# Patient Record
Sex: MALE | Race: WHITE | URBAN - METROPOLITAN AREA
[De-identification: names, ages, dates, MRNs, and addresses within clinical notes are randomized per-mention and may not be internally consistent; named-entity substitution may affect disease eponyms.]

---

## 2023-08-10 ENCOUNTER — WEB ENCOUNTER (OUTPATIENT)
Dept: URBAN - METROPOLITAN AREA CLINIC 40 | Facility: CLINIC | Age: 58
End: 2023-08-10

## 2023-08-10 ENCOUNTER — OFFICE VISIT (OUTPATIENT)
Dept: URBAN - METROPOLITAN AREA CLINIC 40 | Facility: CLINIC | Age: 58
End: 2023-08-10
Payer: COMMERCIAL

## 2023-08-10 ENCOUNTER — LAB OUTSIDE AN ENCOUNTER (OUTPATIENT)
Dept: URBAN - METROPOLITAN AREA CLINIC 40 | Facility: CLINIC | Age: 58
End: 2023-08-10

## 2023-08-10 VITALS
SYSTOLIC BLOOD PRESSURE: 134 MMHG | DIASTOLIC BLOOD PRESSURE: 78 MMHG | TEMPERATURE: 97.7 F | BODY MASS INDEX: 24.35 KG/M2 | HEART RATE: 64 BPM | HEIGHT: 60 IN | WEIGHT: 124 LBS

## 2023-08-10 DIAGNOSIS — R74.8 ELEVATED LIVER ENZYMES: ICD-10-CM

## 2023-08-10 DIAGNOSIS — Z79.899 ON STATIN THERAPY: ICD-10-CM

## 2023-08-10 DIAGNOSIS — Z78.9 MODERATE ALCOHOL CONSUMPTION: ICD-10-CM

## 2023-08-10 DIAGNOSIS — K76.0 FATTY LIVER: ICD-10-CM

## 2023-08-10 PROCEDURE — 99203 OFFICE O/P NEW LOW 30 MIN: CPT | Performed by: PHYSICIAN ASSISTANT

## 2023-08-10 NOTE — HPI-TODAY'S VISIT:
Mr. Platt is a 58-year-old White male presents to the office today for evaluation of mild transaminitis.  Over the course the last year, he has had AST and ALT trending up from the 40s, to more recently in the 80s.  He has maintained normal total bilirubin and alkaline phosphatase.  He denies any significant abdominal pain.  He has had no liver imaging.  Reports that he had viral hepatitis panel completed which was negative for hepatitis A, B, and C.  Denies a family history of liver disease or hepatitis.  Patient himself does admit to moderate alcohol use in the past, typically drinking 4 or more beers on each day of the weekends and maybe 1-2 on weekdays.  He has significantly decreased his consumption of alcohol recently due to elevated liver enzymes.  He has also been on statin therapy Lipitor for several years.  The patient is not obese.  He has had no upper GI complaints of nausea, vomiting, jaundice.  Denies any change in bowel habits, rectal bleeding or melena.  Denies any use of nonprescribed medications, supplements.  His wife accompanies him to his visit today.  They are both originally from [place].

## 2023-08-25 ENCOUNTER — OFFICE VISIT (OUTPATIENT)
Dept: URBAN - METROPOLITAN AREA CLINIC 39 | Facility: CLINIC | Age: 58
End: 2023-08-25
Payer: COMMERCIAL

## 2023-08-25 DIAGNOSIS — R93.2 ABNORMAL ULTRASOUND OF LIVER: ICD-10-CM

## 2023-08-25 PROCEDURE — 76705 ECHO EXAM OF ABDOMEN: CPT | Performed by: INTERNAL MEDICINE

## 2023-08-28 LAB
ACTIN (SMOOTH MUSCLE) ANTIBODY (IGG): <20
ALPHA-1-ANTITRYPSIN QN: 104
ANA SCREEN, IFA: NEGATIVE
CERULOPLASMIN: 19
HEREDITARY HEMOCHROMATOSIS DNA MUT: (no result)
IMMUNOGLOBULIN A: 308
INTERPRETATION: (no result)
MITOCHONDRIAL (M2) ANTIBODY: <=20
TISSUE TRANSGLUTAMINASE AB, IGA: <1

## 2023-09-21 ENCOUNTER — OFFICE VISIT (OUTPATIENT)
Dept: URBAN - METROPOLITAN AREA CLINIC 40 | Facility: CLINIC | Age: 58
End: 2023-09-21
Payer: COMMERCIAL

## 2023-09-21 VITALS
HEART RATE: 60 BPM | HEIGHT: 60 IN | DIASTOLIC BLOOD PRESSURE: 84 MMHG | WEIGHT: 123.2 LBS | SYSTOLIC BLOOD PRESSURE: 118 MMHG | TEMPERATURE: 97.9 F | BODY MASS INDEX: 24.19 KG/M2

## 2023-09-21 DIAGNOSIS — Z78.9 MODERATE ALCOHOL CONSUMPTION: ICD-10-CM

## 2023-09-21 DIAGNOSIS — Z14.8 HEMOCHROMATOSIS CARRIER: ICD-10-CM

## 2023-09-21 DIAGNOSIS — R74.8 ELEVATED LIVER ENZYMES: ICD-10-CM

## 2023-09-21 DIAGNOSIS — Z79.899 ON STATIN THERAPY: ICD-10-CM

## 2023-09-21 PROCEDURE — 99213 OFFICE O/P EST LOW 20 MIN: CPT | Performed by: PHYSICIAN ASSISTANT

## 2023-09-21 NOTE — HPI-TODAY'S VISIT:
Mr. Platt is a 58-year-old White male seen 8/10/23 for evaluation of mild transaminitis.  Over the course the last year, he has had AST and ALT trending up from the 40s, to more recently in the 80s.  He has maintained normal total bilirubin and alkaline phosphatase.  He denies any significant abdominal pain. Reports that he had viral hepatitis panel completed which was negative for hepatitis A, B, and C.  Denies a family history of liver disease or hepatitis.  Patient himself does admit to moderate alcohol use in the past, typically drinking 4 or more beers on each day of the weekends and maybe 1-2 on weekdays.  He has significantly decreased his consumption of alcohol recently due to elevated liver enzymes.  He has also been on statin therapy Lipitor for several years.  The patient is not obese.  He has had no upper GI complaints of nausea, vomiting, jaundice.  Denies any change in bowel habits, rectal bleeding or melena.  Denies any use of nonprescribed medications, supplements. Recent HFP improved, AST and ALT decreasing again towards normal. Decreased alcohol. RUQ US 8/25/23 was normal. CLD panel negative. 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

## 2023-12-27 ENCOUNTER — DASHBOARD ENCOUNTERS (OUTPATIENT)
Age: 58
End: 2023-12-27

## 2023-12-27 ENCOUNTER — OFFICE VISIT (OUTPATIENT)
Dept: URBAN - METROPOLITAN AREA CLINIC 40 | Facility: CLINIC | Age: 58
End: 2023-12-27

## 2023-12-27 ENCOUNTER — OFFICE VISIT (OUTPATIENT)
Dept: URBAN - METROPOLITAN AREA CLINIC 40 | Facility: CLINIC | Age: 58
End: 2023-12-27
Payer: COMMERCIAL

## 2023-12-27 VITALS
HEIGHT: 60 IN | WEIGHT: 126 LBS | BODY MASS INDEX: 24.74 KG/M2 | DIASTOLIC BLOOD PRESSURE: 72 MMHG | SYSTOLIC BLOOD PRESSURE: 112 MMHG | HEART RATE: 74 BPM

## 2023-12-27 DIAGNOSIS — Z14.8 HEMOCHROMATOSIS CARRIER: ICD-10-CM

## 2023-12-27 DIAGNOSIS — Z78.9 MODERATE ALCOHOL CONSUMPTION: ICD-10-CM

## 2023-12-27 DIAGNOSIS — Z79.899 ON STATIN THERAPY: ICD-10-CM

## 2023-12-27 DIAGNOSIS — R74.8 ELEVATED LIVER ENZYMES: ICD-10-CM

## 2023-12-27 PROCEDURE — 99213 OFFICE O/P EST LOW 20 MIN: CPT | Performed by: PHYSICIAN ASSISTANT

## 2023-12-27 NOTE — HPI-TODAY'S VISIT:
Mr. Platt is a 58-year-old White male seen 9/21/23 for evaluation of mild transaminitis.  Over the course the last year, he has had AST and ALT trending up from the 40s, to more recently in the 80s.  He has maintained normal total bilirubin and alkaline phosphatase.  He denies any significant abdominal pain. Reports that he had viral hepatitis panel completed which was negative for hepatitis A, B, and C.  Denies a family history of liver disease or hepatitis.  Patient himself does admit to moderate alcohol use in the past, typically drinking 4 or more beers on each day of the weekends and maybe 1-2 on weekdays.  He has significantly decreased his consumption of alcohol recently due to elevated liver enzymes.  He has also been on statin therapy Lipitor for several years.  The patient is not obese.  He has had no upper GI complaints of nausea, vomiting, jaundice.  Denies any change in bowel habits, rectal bleeding or melena.  Denies any use of nonprescribed medications, supplements. Recent HFP improved, AST and ALT decreasing again towards normal. Decreased alcohol. RUQ US 8/25/23 was normal. CLD panel negative. 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 He tells me that he did have repeat labs on December 12, 2023 by his new primary care provider but does not have the results with him nor can he remember the name of his new primary care provider.  He will contact our office with that information but admits that he was told that his LFTs had improved.  He has been able to cut back on his drinking but admits his diet is still needing some improvement though his cholesterol has reportedly also decreased.

## 2024-06-17 ENCOUNTER — OFFICE VISIT (OUTPATIENT)
Dept: URBAN - METROPOLITAN AREA CLINIC 40 | Facility: CLINIC | Age: 59
End: 2024-06-17
Payer: COMMERCIAL

## 2024-06-17 ENCOUNTER — LAB OUTSIDE AN ENCOUNTER (OUTPATIENT)
Dept: URBAN - METROPOLITAN AREA CLINIC 40 | Facility: CLINIC | Age: 59
End: 2024-06-17

## 2024-06-17 VITALS
DIASTOLIC BLOOD PRESSURE: 78 MMHG | SYSTOLIC BLOOD PRESSURE: 120 MMHG | TEMPERATURE: 98.1 F | HEART RATE: 76 BPM | WEIGHT: 129 LBS | BODY MASS INDEX: 25.32 KG/M2 | HEIGHT: 60 IN

## 2024-06-17 DIAGNOSIS — Z14.8 HEMOCHROMATOSIS CARRIER: ICD-10-CM

## 2024-06-17 DIAGNOSIS — Z78.9 MODERATE ALCOHOL CONSUMPTION: ICD-10-CM

## 2024-06-17 DIAGNOSIS — R74.8 ELEVATED LIVER ENZYMES: ICD-10-CM

## 2024-06-17 DIAGNOSIS — Z79.899 ON STATIN THERAPY: ICD-10-CM

## 2024-06-17 PROCEDURE — 99214 OFFICE O/P EST MOD 30 MIN: CPT | Performed by: PHYSICIAN ASSISTANT

## 2024-06-17 RX ORDER — ATORVASTATIN CALCIUM 10 MG/1
1 TABLET TABLET, FILM COATED ORAL ONCE A DAY
Status: ACTIVE | COMMUNITY

## 2024-06-17 NOTE — HPI-TODAY'S VISIT:
Mr. Platt is a 58-year-old White male seen 12/27/23 for evaluation of mild transaminitis.  Over the course the last year, he has had AST and ALT trending up from the 40s, to more recently in the 80s.  He has maintained normal total bilirubin and alkaline phosphatase.  He denies any significant abdominal pain. Reports that he had viral hepatitis panel completed which was negative for hepatitis A, B, and C.  Denies a family history of liver disease or hepatitis.  Patient himself does admit to moderate alcohol use in the past, typically drinking 4 or more beers on each day of the weekends and maybe 1-2 on weekdays.  He has significantly decreased his consumption of alcohol recently due to elevated liver enzymes.  He has also been on statin therapy Lipitor for several years.  The patient is not obese.  He has had no upper GI complaints of nausea, vomiting, jaundice.  Denies any change in bowel habits, rectal bleeding or melena.  Denies any use of nonprescribed medications, supplements. Last set of labs showed improvement, AST and ALT decreasing again towards normal. Decreased alcohol. RUQ US 8/25/23 was normal. CLD panel negative. 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 He has been able to cut back on his drinking but admits his diet is still needing some improvement though his cholesterol has reportedly also decreased. Recent labs reviewed in [facility] including CMP from June 12, 2024.  Increasing AST of 77, ALT of 56 with alk phos normal 45 and normal bilirubin of 0.6.  B12 normal.  Hemoglobin and hematocrit normal with  and platelets down 140k. Fib4 score calculated at 4.39, increased risk for advanced fibrosis. For short period of time, patient had held statin, now resumed. As following 2.  Frequently no GI complaints.  Denies abdominal pain or change in bowel habits.  No jaundice. Diet essentailly unchanges, chewing much gum and drinking mores sweet ice tea.

## 2024-07-03 ENCOUNTER — TELEPHONE ENCOUNTER (OUTPATIENT)
Dept: URBAN - METROPOLITAN AREA CLINIC 40 | Facility: CLINIC | Age: 59
End: 2024-07-03

## 2024-10-14 ENCOUNTER — OFFICE VISIT (OUTPATIENT)
Dept: URBAN - METROPOLITAN AREA CLINIC 40 | Facility: CLINIC | Age: 59
End: 2024-10-14
Payer: COMMERCIAL

## 2024-10-14 ENCOUNTER — LAB OUTSIDE AN ENCOUNTER (OUTPATIENT)
Dept: URBAN - METROPOLITAN AREA CLINIC 40 | Facility: CLINIC | Age: 59
End: 2024-10-14

## 2024-10-14 DIAGNOSIS — Z78.9 MODERATE ALCOHOL CONSUMPTION: ICD-10-CM

## 2024-10-14 DIAGNOSIS — Z79.899 ON STATIN THERAPY: ICD-10-CM

## 2024-10-14 DIAGNOSIS — Z14.8 HEMOCHROMATOSIS CARRIER: ICD-10-CM

## 2024-10-14 DIAGNOSIS — R74.8 ELEVATED LIVER ENZYMES: ICD-10-CM

## 2024-10-14 PROCEDURE — 99213 OFFICE O/P EST LOW 20 MIN: CPT | Performed by: PHYSICIAN ASSISTANT

## 2024-10-14 RX ORDER — ATORVASTATIN CALCIUM 10 MG/1
1 TABLET TABLET, FILM COATED ORAL ONCE A DAY
Status: ACTIVE | COMMUNITY

## 2024-10-14 NOTE — HPI-TODAY'S VISIT:
Mr. Platt is a 59-year-old White male seen 6/17/24, for evaluation of mild transaminitis.  Over the course the last year, he has had AST and ALT trending up from the 40s, to more recently in the 80s.  He has maintained normal total bilirubin and alkaline phosphatase.  He denies any significant abdominal pain. Reports that he had viral hepatitis panel completed which was negative for hepatitis A, B, and C.  Denies a family history of liver disease or hepatitis.  Patient himself does admit to moderate alcohol use in the past, typically drinking 4 or more beers on each day of the weekends and maybe 1-2 on weekdays.  He has significantly decreased his consumption of alcohol recently due to elevated liver enzymes.  He has also been on statin therapy Lipitor for several years.  The patient is not obese.  He has had no upper GI complaints of nausea, vomiting, jaundice.  Denies any change in bowel habits, rectal bleeding or melena.  Denies any use of nonprescribed medications, supplements. Past set of labs showed improvement, AST and ALT decreasing again towards normal. Decreased alcohol. RUQ US 8/25/23 was normal. CLD panel negative. 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 Recent labs reviewed in [facility] including CMP from June 12, 2024.  Increasing AST of 77, ALT of 56 with alk phos normal 45 and normal bilirubin of 0.6.  B12 normal.  Hemoglobin and hematocrit normal with  and platelets down 140k. Fib4 score calculated at 4.39, increased risk for advanced fibrosis. For short period of time, patient had held statin, now resumed. The patient has no GI complaints.  Denies abdominal pain or change in bowel habits.  No jaundice. Diet essentailly unchanges, chewing much gum and drinking mores sweet ice tea. Sept 2024 labs showing AST and ALT in 80-90s with recent labs. Alk Phos. Tbili normal. Atorvastatin recently reduced to 10mg. Plts 118k. Patient's insurance denied liver biopsy. Fib4 on statin 5.19. Patient has no complaints today.  Once again reiterates that he has no family currently consumed alcohol since last visit, admits that his dose reduction of statin was 3 to 4 months ago.

## 2024-11-04 ENCOUNTER — TELEPHONE ENCOUNTER (OUTPATIENT)
Dept: URBAN - METROPOLITAN AREA CLINIC 40 | Facility: CLINIC | Age: 59
End: 2024-11-04

## 2025-03-10 ENCOUNTER — LAB OUTSIDE AN ENCOUNTER (OUTPATIENT)
Dept: URBAN - METROPOLITAN AREA CLINIC 40 | Facility: CLINIC | Age: 60
End: 2025-03-10

## 2025-03-10 ENCOUNTER — OFFICE VISIT (OUTPATIENT)
Dept: URBAN - METROPOLITAN AREA CLINIC 40 | Facility: CLINIC | Age: 60
End: 2025-03-10
Payer: COMMERCIAL

## 2025-03-10 VITALS
HEART RATE: 85 BPM | BODY MASS INDEX: 25.32 KG/M2 | HEIGHT: 60 IN | TEMPERATURE: 97.6 F | DIASTOLIC BLOOD PRESSURE: 72 MMHG | SYSTOLIC BLOOD PRESSURE: 118 MMHG | WEIGHT: 129 LBS

## 2025-03-10 DIAGNOSIS — Z14.8 HEMOCHROMATOSIS CARRIER: ICD-10-CM

## 2025-03-10 DIAGNOSIS — Z78.9 MODERATE ALCOHOL CONSUMPTION: ICD-10-CM

## 2025-03-10 DIAGNOSIS — R74.8 ELEVATED LIVER ENZYMES: ICD-10-CM

## 2025-03-10 DIAGNOSIS — Z79.899 ON STATIN THERAPY: ICD-10-CM

## 2025-03-10 PROCEDURE — 99213 OFFICE O/P EST LOW 20 MIN: CPT | Performed by: PHYSICIAN ASSISTANT

## 2025-03-10 RX ORDER — ATORVASTATIN CALCIUM 10 MG/1
1 TABLET TABLET, FILM COATED ORAL ONCE A DAY
Status: ACTIVE | COMMUNITY

## 2025-03-10 NOTE — HPI-TODAY'S VISIT:
Mr. Platt is a 59-year-old White male seen 10/14/24, for evaluation of mild transaminitis. He had viral hepatitis panel completed which was negative for hepatitis A, B, and C.  Denies a family history of liver disease or hepatitis.  Patient himself does admit to moderate alcohol use in the past, typically drinking 4 or more beers on each day of the weekends and maybe 1-2 on weekdays.  He has significantly decreased his consumption of alcohol recently due to elevated liver enzymes.  He has also been on statin therapy Lipitor for several years.  The patient is not obese.  He has had no upper GI complaints of nausea, vomiting, jaundice.  Denies any change in bowel habits, rectal bleeding or melena.  Denies any use of nonprescribed medications, supplements.   RUQ US 8/25/23 was normal. CLD panel negative. 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  Fib4 score calculated at 4.39, increased risk for advanced fibrosis. For short period of time, patient had held statin, now resumed. The patient has no GI complaints.  Denies abdominal pain or change in bowel habits.  No jaundice. AST and ALT in the 80s and 90s.  Tbili and Alk Phos normal.  Patient FibroScan from November 2024 with [facility] noting no evidence of steatosis and F0 hepatic fibrosis. Patient did not repeat viral hepatitis labs as ordered. No GI complaints today. He is declining to schedule screening colonoscopy.